# Patient Record
Sex: FEMALE | Race: WHITE | HISPANIC OR LATINO | Employment: FULL TIME | URBAN - METROPOLITAN AREA
[De-identification: names, ages, dates, MRNs, and addresses within clinical notes are randomized per-mention and may not be internally consistent; named-entity substitution may affect disease eponyms.]

---

## 2017-01-17 ENCOUNTER — TELEPHONE (OUTPATIENT)
Dept: OBGYN | Facility: MEDICAL CENTER | Age: 22
End: 2017-01-17

## 2017-01-17 DIAGNOSIS — N93.8 DUB (DYSFUNCTIONAL UTERINE BLEEDING): ICD-10-CM

## 2017-01-17 NOTE — TELEPHONE ENCOUNTER
Pt states that she has isreal for a year and a half and states that she has always had a regular a period however for the last couple months it has been irregular with spotting and coming later. Pt states that she is late 6 days and has taking pregnancy tests and they have all come back negative but pt is concerned.

## 2017-01-17 NOTE — TELEPHONE ENCOUNTER
Pt called back   Pts cycle are irregular & experience spotting  Pt has always gotten some spotting   Cycles have been irregular since she has gotten the IUD   Advised pt that it is normal for the IUD  UPT have come back negative  Pt is wanting a blood test to confirm that she is not pregnant  Pended HCG   Routed to Dr ALFRED

## 2017-01-17 NOTE — TELEPHONE ENCOUNTER
Left message for pt to call back   Pt needs to make an appt to discuss her cycles with Dr Montesinos

## 2017-09-26 ENCOUNTER — OFFICE VISIT (OUTPATIENT)
Dept: MEDICAL GROUP | Facility: MEDICAL CENTER | Age: 22
End: 2017-09-26
Payer: COMMERCIAL

## 2017-09-26 ENCOUNTER — HOSPITAL ENCOUNTER (OUTPATIENT)
Facility: MEDICAL CENTER | Age: 22
End: 2017-09-26
Attending: PHYSICIAN ASSISTANT
Payer: COMMERCIAL

## 2017-09-26 VITALS
HEART RATE: 72 BPM | OXYGEN SATURATION: 98 % | TEMPERATURE: 97.9 F | RESPIRATION RATE: 16 BRPM | HEIGHT: 64 IN | SYSTOLIC BLOOD PRESSURE: 108 MMHG | WEIGHT: 114.8 LBS | BODY MASS INDEX: 19.6 KG/M2 | DIASTOLIC BLOOD PRESSURE: 70 MMHG

## 2017-09-26 DIAGNOSIS — N89.8 VAGINAL DISCHARGE: ICD-10-CM

## 2017-09-26 PROCEDURE — 87660 TRICHOMONAS VAGIN DIR PROBE: CPT

## 2017-09-26 PROCEDURE — 99214 OFFICE O/P EST MOD 30 MIN: CPT | Performed by: PHYSICIAN ASSISTANT

## 2017-09-26 PROCEDURE — 87491 CHLMYD TRACH DNA AMP PROBE: CPT

## 2017-09-26 PROCEDURE — 87480 CANDIDA DNA DIR PROBE: CPT

## 2017-09-26 PROCEDURE — 87510 GARDNER VAG DNA DIR PROBE: CPT

## 2017-09-26 PROCEDURE — 87591 N.GONORRHOEAE DNA AMP PROB: CPT

## 2017-09-26 RX ORDER — FLUCONAZOLE 150 MG/1
150 TABLET ORAL ONCE
Qty: 2 TAB | Refills: 0 | Status: SHIPPED | OUTPATIENT
Start: 2017-09-26 | End: 2017-09-26

## 2017-09-26 ASSESSMENT — PATIENT HEALTH QUESTIONNAIRE - PHQ9: CLINICAL INTERPRETATION OF PHQ2 SCORE: 0

## 2017-09-26 NOTE — ASSESSMENT & PLAN NOTE
This is a 21-year-old female complains of a five-day history of possible urinary tract infection symptoms. She states that those symptoms improved and she is not having any complaints today. Was having some slight burning urination. Associated vaginal discharge that was brown. Associated itching. She recently was seen by a dentist and given Norco and amoxicillin. She took amoxicillin for 7 days. She is monogamous. Has had no previous STDs. She does have a chronic history of UTIs. These symptoms are very different. She has no urinary hesitancy or frequency.

## 2017-09-26 NOTE — PROGRESS NOTES
"Subjective:   Lupe Eaton is a 21 y.o. female here today for vaginal discharge with some vaginal itching and burning urination for 5 days.    Vaginal discharge  This is a 21-year-old female complains of a five-day history of possible urinary tract infection symptoms. She states that those symptoms improved and she is not having any complaints today. Was having some slight burning urination. Associated vaginal discharge that was brown. Associated itching. She recently was seen by a dentist and given Norco and amoxicillin. She took amoxicillin for 7 days. She is monogamous. Has had no previous STDs. She does have a chronic history of UTIs. These symptoms are very different. She has no urinary hesitancy or frequency.       Current medicines (including changes today)  Current Outpatient Prescriptions   Medication Sig Dispense Refill   • fluconazole (DIFLUCAN) 150 MG tablet Take 1 Tab by mouth Once for 1 dose. Then possibly another dose after 3 days. 2 Tab 0   • Levonorgestrel (ZAID) 13.5 MG IUD by Intrauterine route.       No current facility-administered medications for this visit.      She  has a past medical history of Allergy and Arrhythmia.    ROS   No chest pain, no shortness of breath, no abdominal pain and all other systems were reviewed and are negative.       Objective:     Blood pressure 108/70, pulse 72, temperature 36.6 °C (97.9 °F), resp. rate 16, height 1.626 m (5' 4\"), weight 52.1 kg (114 lb 12.8 oz), SpO2 98 %. Body mass index is 19.71 kg/m².   Physical Exam:  Constitutional: Alert, no distress.  Skin: Warm, dry, good turgor, no rashes in visible areas.  Eye: Equal, round and reactive, conjunctiva clear, lids normal.  ENMT: Lips without lesions, good dentition, oropharynx clear.  Neck: Trachea midline, no masses.   Lymph: No cervical or supraclavicular lymphadenopathy  Respiratory: Unlabored respiratory effort, lungs appear clear, no wheezes.  Cardiovascular: Regular rate and rhythm, no " edema.  Abdomen: Soft, non-tender, no masses. No CVA tenderness.  Psych: Alert and oriented x3, normal affect and mood.        Assessment and Plan:   The following treatment plan was discussed    1. Vaginal discharge  Acute, new onset condition. Unlikely UTI symptoms are not currently present. Concerned about recent antibiotic use. Could be a yeast infection but discharge is brown and not white. Treated with Diflucan 150 mg once and then 3 days after still symptomatic. Given her age we'll check gonorrhea and chlamydia. Also will have her self swab for vaginal DNA panel. Follow up with any worsening symptoms such as fevers or back pain.  - CHLAMYDIA/GC PCR URINE OR SWAB; Future  - VAGINAL PATHOGENS DNA PANEL; Future  - fluconazole (DIFLUCAN) 150 MG tablet; Take 1 Tab by mouth Once for 1 dose. Then possibly another dose after 3 days.  Dispense: 2 Tab; Refill: 0      Followup: Return if symptoms worsen or fail to improve.    Please note that this dictation was created using voice recognition software. I have made every reasonable attempt to correct obvious errors, but I expect that there are errors of grammar and possibly content that I did not discover before finalizing the note.

## 2017-09-27 LAB
C TRACH DNA SPEC QL NAA+PROBE: NEGATIVE
N GONORRHOEA DNA SPEC QL NAA+PROBE: NEGATIVE
SPECIMEN SOURCE: NORMAL

## 2017-12-22 ENCOUNTER — APPOINTMENT (OUTPATIENT)
Dept: MEDICAL GROUP | Facility: LAB | Age: 22
End: 2017-12-22
Payer: COMMERCIAL

## 2018-01-15 ENCOUNTER — APPOINTMENT (OUTPATIENT)
Dept: RADIOLOGY | Facility: IMAGING CENTER | Age: 23
End: 2018-01-15
Attending: NURSE PRACTITIONER
Payer: COMMERCIAL

## 2018-01-15 ENCOUNTER — OFFICE VISIT (OUTPATIENT)
Dept: URGENT CARE | Facility: CLINIC | Age: 23
End: 2018-01-15
Payer: COMMERCIAL

## 2018-01-15 VITALS
TEMPERATURE: 97.5 F | BODY MASS INDEX: 19.29 KG/M2 | WEIGHT: 113 LBS | HEART RATE: 78 BPM | SYSTOLIC BLOOD PRESSURE: 112 MMHG | DIASTOLIC BLOOD PRESSURE: 74 MMHG | OXYGEN SATURATION: 100 % | HEIGHT: 64 IN

## 2018-01-15 DIAGNOSIS — R07.89 CHEST WALL PAIN: ICD-10-CM

## 2018-01-15 DIAGNOSIS — R07.89 COSTOCHONDRAL CHEST PAIN: ICD-10-CM

## 2018-01-15 PROCEDURE — 99214 OFFICE O/P EST MOD 30 MIN: CPT | Performed by: NURSE PRACTITIONER

## 2018-01-15 PROCEDURE — 71046 X-RAY EXAM CHEST 2 VIEWS: CPT | Mod: TC | Performed by: NURSE PRACTITIONER

## 2018-01-15 ASSESSMENT — ENCOUNTER SYMPTOMS
DIAPHORESIS: 0
CLAUDICATION: 0
VOMITING: 0
FEVER: 0
HEADACHES: 0
IRREGULAR HEARTBEAT: 0
BACK PAIN: 0
SHORTNESS OF BREATH: 0
PALPITATIONS: 0

## 2018-01-16 NOTE — PATIENT INSTRUCTIONS
Costochondritis  Costochondritis, sometimes called Tietze syndrome, is a swelling and irritation (inflammation) of the tissue (cartilage) that connects your ribs with your breastbone (sternum). It causes pain in the chest and rib area. Costochondritis usually goes away on its own over time. It can take up to 6 weeks or longer to get better, especially if you are unable to limit your activities.  CAUSES   Some cases of costochondritis have no known cause. Possible causes include:  · Injury (trauma).  · Exercise or activity such as lifting.  · Severe coughing.  SIGNS AND SYMPTOMS  · Pain and tenderness in the chest and rib area.  · Pain that gets worse when coughing or taking deep breaths.  · Pain that gets worse with specific movements.  DIAGNOSIS   Your health care provider will do a physical exam and ask about your symptoms. Chest X-rays or other tests may be done to rule out other problems.  TREATMENT   Costochondritis usually goes away on its own over time. Your health care provider may prescribe medicine to help relieve pain.  HOME CARE INSTRUCTIONS   · Avoid exhausting physical activity. Try not to strain your ribs during normal activity. This would include any activities using chest, abdominal, and side muscles, especially if heavy weights are used.  · Apply ice to the affected area for the first 2 days after the pain begins.  ¨ Put ice in a plastic bag.  ¨ Place a towel between your skin and the bag.  ¨ Leave the ice on for 20 minutes, 2-3 times a day.  · Only take over-the-counter or prescription medicines as directed by your health care provider.  SEEK MEDICAL CARE IF:  · You have redness or swelling at the rib joints. These are signs of infection.  · Your pain does not go away despite rest or medicine.  SEEK IMMEDIATE MEDICAL CARE IF:   · Your pain increases or you are very uncomfortable.  · You have shortness of breath or difficulty breathing.  · You cough up blood.  · You have worse chest pains,  sweating, or vomiting.  · You have a fever or persistent symptoms for more than 2-3 days.  · You have a fever and your symptoms suddenly get worse.  MAKE SURE YOU:   · Understand these instructions.  · Will watch your condition.  · Will get help right away if you are not doing well or get worse.     This information is not intended to replace advice given to you by your health care provider. Make sure you discuss any questions you have with your health care provider.     Document Released: 09/27/2006 Document Revised: 10/08/2014 Document Reviewed: 07/22/2014  [employer] Interactive Patient Education ©2016 [employer]

## 2018-01-16 NOTE — PROGRESS NOTES
Subjective:      Lupe Eaton is a 22 y.o. female who presents with Chest Pain (Chest pain/patient had to lay down yesterday she states it hurt her so much/still persistant/no other symptoms)            Chest Pain    This is a new problem. Episode onset: Pt reports onset of chest pain 2 days ago. The pain became so intense yesterday she had to lie down to help it resolve. She reports the pain is constant and it hasn't changed much since onset. The onset quality is sudden. The problem occurs constantly. The problem has been unchanged. The pain is present in the lateral region. The pain is moderate. The quality of the pain is described as sharp. The pain does not radiate. Pertinent negatives include no back pain, claudication, diaphoresis, fever, headaches, irregular heartbeat, malaise/fatigue, palpitations, shortness of breath or vomiting. Associated symptoms comments: She denies any other symptoms associated with this pain. Denies any trauma to chest or recent cough/cold. She is concerned today because her father has hx of MI 3 separate times. She also reports that she participates in aerial exercise classes but her last class was 5 days before the onset of this pain and she doesn't remember hurting herself. Past medical history comments: no personal cardiac or significant medical history       Review of Systems   Constitutional: Negative for diaphoresis, fever and malaise/fatigue.   Respiratory: Negative for shortness of breath.    Cardiovascular: Positive for chest pain. Negative for palpitations and claudication.   Gastrointestinal: Negative for vomiting.   Musculoskeletal: Negative for back pain.   Neurological: Negative for headaches.   All other systems reviewed and are negative.    Past Medical History:   Diagnosis Date   • Allergy    • Arrhythmia     unknown as a child     History reviewed. No pertinent surgical history.   Social History     Social History   • Marital status: Single     Spouse name: N/A  "  • Number of children: N/A   • Years of education: N/A     Occupational History   • Not on file.     Social History Main Topics   • Smoking status: Never Smoker   • Smokeless tobacco: Never Used   • Alcohol use 1.0 oz/week     2 Glasses of wine per week   • Drug use: No   • Sexual activity: Yes     Partners: Male      Comment: LMP: on it now.      Other Topics Concern   • Not on file     Social History Narrative    born in [place],     works at [employer] and is working at [employer] her  degree          Objective:     /74   Pulse 78   Temp 36.4 °C (97.5 °F)   Ht 1.626 m (5' 4\")   Wt 51.3 kg (113 lb)   SpO2 100%   BMI 19.40 kg/m²      Physical Exam   Constitutional: She is oriented to person, place, and time. Vital signs are normal. She appears well-developed and well-nourished.   She appears to be anxious during exam   HENT:   Head: Normocephalic and atraumatic.   Nose: Nose normal.   Eyes: EOM are normal. Pupils are equal, round, and reactive to light.   Neck: Normal range of motion.   Cardiovascular: Normal rate, regular rhythm and normal heart sounds.    Pulmonary/Chest: Effort normal and breath sounds normal. She exhibits tenderness. She exhibits no mass, no bony tenderness and no deformity.       Musculoskeletal: Normal range of motion.   Neurological: She is alert and oriented to person, place, and time.   Skin: Skin is warm and dry. Capillary refill takes less than 2 seconds.   Psychiatric: She has a normal mood and affect. Her speech is normal and behavior is normal. Thought content normal.   Vitals reviewed.         EKG: NSR rate:68, normal axis, normal intervals, no evidence of ischemia or hypertrophy.    CXR: no acute cardiopulmonary process by my read, radiology pending.       1/15/2018 4:34 PM    HISTORY/REASON FOR EXAM:  Chest Pain.      TECHNIQUE/EXAM DESCRIPTION AND NUMBER OF VIEWS:  Two views of the chest.    COMPARISON:  None.    FINDINGS:  The heart is normal in " size.  No pulmonary infiltrates or consolidations are noted.  No pleural effusions are appreciated.  Hyperexpanded lungs.  Mild/moderate left convex scoliosis of the thoracic spine.   Impression       No pulmonary consolidation.       Assessment/Plan:     1. Chest wall pain  - DX-CHEST-2 VIEWS; Future  - EKG    2. Costochondral chest pain    Discussed with patient it is reassuring that her pain can be reproduced on exam. I am not concerned at this time that this is cardiac in origin. She V/U  I would like her to take 600 mg ibuprofen when she feels an increase in discomfort  The pain may last up to 2-3 weeks but should slowly begin to improve  She can continue to work out, let pain be her guide  Educational handout provided on costochondral pain  Supportive care, differential diagnoses, and indications for immediate follow-up discussed with patient.    Pathogenesis of diagnosis discussed including typical length and natural progression.      Instructed to return to  or nearest emergency department if symptoms fail to improve, for any change in condition, further concerns, or new concerning symptoms.  Patient states understanding of the plan of care and discharge instructions.

## 2018-03-28 ENCOUNTER — TELEPHONE (OUTPATIENT)
Dept: OBGYN | Facility: MEDICAL CENTER | Age: 23
End: 2018-03-28

## 2018-03-28 NOTE — TELEPHONE ENCOUNTER
----- Message from Rodo Portillo sent at 3/27/2018 11:10 AM PDT -----  Regarding: IUD ?s  Contact: [phone]  Pt needs to remove IUD and have it replaced. Has questions about whether she needs to wait until July or not.

## 2018-03-28 NOTE — TELEPHONE ENCOUNTER
Called the patient and left voicemail for pt to call back to discuss process of iud removal and replacement.

## 2018-04-03 NOTE — TELEPHONE ENCOUNTER
Patient called and I discussed the process and e-mailed her a form to sign and send back to get the process started

## 2018-04-04 ENCOUNTER — TELEPHONE (OUTPATIENT)
Dept: OBGYN | Facility: MEDICAL CENTER | Age: 23
End: 2018-04-04

## 2018-04-04 DIAGNOSIS — Z30.433 ENCOUNTER FOR IUD REMOVAL AND REINSERTION: ICD-10-CM

## 2018-04-05 ENCOUNTER — GYNECOLOGY VISIT (OUTPATIENT)
Dept: OBGYN | Facility: MEDICAL CENTER | Age: 23
End: 2018-04-05
Payer: COMMERCIAL

## 2018-04-05 ENCOUNTER — HOSPITAL ENCOUNTER (OUTPATIENT)
Facility: MEDICAL CENTER | Age: 23
End: 2018-04-05
Attending: OBSTETRICS & GYNECOLOGY
Payer: COMMERCIAL

## 2018-04-05 VITALS
SYSTOLIC BLOOD PRESSURE: 105 MMHG | DIASTOLIC BLOOD PRESSURE: 60 MMHG | BODY MASS INDEX: 19.63 KG/M2 | HEIGHT: 64 IN | WEIGHT: 115 LBS

## 2018-04-05 DIAGNOSIS — Z01.419 WELL WOMAN EXAM WITH ROUTINE GYNECOLOGICAL EXAM: ICD-10-CM

## 2018-04-05 DIAGNOSIS — Z11.3 SCREEN FOR SEXUALLY TRANSMITTED DISEASES: ICD-10-CM

## 2018-04-05 DIAGNOSIS — N39.0 URINARY TRACT INFECTION WITHOUT HEMATURIA, SITE UNSPECIFIED: ICD-10-CM

## 2018-04-05 DIAGNOSIS — N30.00 ACUTE CYSTITIS WITHOUT HEMATURIA: ICD-10-CM

## 2018-04-05 DIAGNOSIS — Z12.4 PAP SMEAR FOR CERVICAL CANCER SCREENING: ICD-10-CM

## 2018-04-05 LAB
APPEARANCE UR: CLEAR
BILIRUB UR STRIP-MCNC: NEGATIVE MG/DL
COLOR UR AUTO: YELLOW
GLUCOSE UR STRIP.AUTO-MCNC: NEGATIVE MG/DL
KETONES UR STRIP.AUTO-MCNC: NEGATIVE MG/DL
LEUKOCYTE ESTERASE UR QL STRIP.AUTO: NEGATIVE
NITRITE UR QL STRIP.AUTO: POSITIVE
PH UR STRIP.AUTO: 5.5 [PH] (ref 5–8)
PROT UR QL STRIP: NEGATIVE MG/DL
RBC UR QL AUTO: NEGATIVE
SP GR UR STRIP.AUTO: 1.02
UROBILINOGEN UR STRIP-MCNC: NORMAL MG/DL

## 2018-04-05 PROCEDURE — 87591 N.GONORRHOEAE DNA AMP PROB: CPT

## 2018-04-05 PROCEDURE — 87491 CHLMYD TRACH DNA AMP PROBE: CPT

## 2018-04-05 PROCEDURE — 88175 CYTOPATH C/V AUTO FLUID REDO: CPT

## 2018-04-05 PROCEDURE — 99395 PREV VISIT EST AGE 18-39: CPT | Performed by: OBSTETRICS & GYNECOLOGY

## 2018-04-05 PROCEDURE — 81002 URINALYSIS NONAUTO W/O SCOPE: CPT | Performed by: OBSTETRICS & GYNECOLOGY

## 2018-04-05 RX ORDER — NITROFURANTOIN 25; 75 MG/1; MG/1
100 CAPSULE ORAL 2 TIMES DAILY
Qty: 14 CAP | Refills: 0 | Status: SHIPPED | OUTPATIENT
Start: 2018-04-05 | End: 2018-09-11

## 2018-04-05 NOTE — PROGRESS NOTES
"Subjective:      Lupe Eaton is a 22 y.o. female who presents with Annual Exam (annual exam-iud issues)        CC: annual exam    HPI: 21 yo G0 using Yana for contraception (placed 7/15) presents for annual exam. Complains of foul smell to urine and some pain with intercourse. No hx of abnormal pap smears.    ROS REVIEW OF SYSTEMS:    Pertinent positives and negatives mentioned in HPI.    All other systems reviewed and are negative or noncontributory.       Objective:     /60   Ht 1.626 m (5' 4\")   Wt 52.2 kg (115 lb)   Breastfeeding? No   BMI 19.74 kg/m²      Physical Exam      GENERAL: Alert, in no apparent distress  PSYCHIATRIC: Appropriate affect, intact insight and judgement.  NECK:  Nontender, no masses.  No Thyromegaly or nodules. No lymphadenopathy.  RESPIRATORY: Normal respiratory effort.  Lungs clear to auscultation.   CARDIOVASCULAR: RRR, no murmur, gallop, or rub.  ABDOMEN: Soft, nontender, nondistended.  No palpable masses.  No rebound or guarding.  No inguinal lymphadenopathy.  No hepatosplenomegaly.  No hernias.  BACK: No CVA tenderness  EXTREMITIES: No edema  SKIN: No rash    BREAST: No masses or tenderness.  No skin changes.  No nipple inversion or discharge. No axillary lymphadenopathy.      GENITOURINARY:  Normal external genitalia, no lesions.  Normal urethral meatus, no masses or tenderness.  Normal bladder without fullness or masses.  Vagina well estrogenized, no vaginal discharge or lesions.  Cervix without lesions or discharge, nontender. IUD strings present at cervical os.   Uterus normal size, shape, and contour, nontender.  Adnexa nontender, no masses.  Normal anus and perineum.    Rectal Exam - not indicated.     Urine dip - + nitrates  Assessment/Plan:     1. Well woman exam with routine gynecological exam  Reviewed monthly self breast exams and need for yearly mammograms starting at age 40.  Discussed calcium intake, Vitamin D,  and weight bearing exercise for bone " health.   - THINPREP RFLX HPV ASCUS W/CTNG; Future    2. Screen for sexually transmitted diseases  - THINPREP RFLX HPV ASCUS W/CTNG; Future    3. Pap smear for cervical cancer screening  - THINPREP RFLX HPV ASCUS W/CTNG; Future    4. Urinary tract infection without hematuria, site unspecified  Macrobid 100 mg po bid for 7 days.   - POCT Urinalysis    5. Acute cystitis without hematuria  - POCT Urinalysis  - URINE CULTURE(NEW); Future    F/U for Yana removal and replacement (pt states she already had referral placed).

## 2018-04-07 LAB
C TRACH DNA GENITAL QL NAA+PROBE: NEGATIVE
CYTOLOGY REG CYTOL: NORMAL
N GONORRHOEA DNA GENITAL QL NAA+PROBE: NEGATIVE
SPECIMEN SOURCE: NORMAL

## 2018-08-06 ENCOUNTER — GYNECOLOGY VISIT (OUTPATIENT)
Dept: OBGYN | Facility: MEDICAL CENTER | Age: 23
End: 2018-08-06
Payer: COMMERCIAL

## 2018-08-06 VITALS
WEIGHT: 116 LBS | DIASTOLIC BLOOD PRESSURE: 62 MMHG | HEIGHT: 64 IN | BODY MASS INDEX: 19.81 KG/M2 | SYSTOLIC BLOOD PRESSURE: 110 MMHG

## 2018-08-06 DIAGNOSIS — Z30.430 ENCOUNTER FOR IUD INSERTION: ICD-10-CM

## 2018-08-06 LAB
INT CON NEG: NEGATIVE
INT CON POS: POSITIVE
POC URINE PREGNANCY TEST: NEGATIVE

## 2018-08-06 PROCEDURE — 81025 URINE PREGNANCY TEST: CPT | Performed by: OBSTETRICS & GYNECOLOGY

## 2018-08-06 PROCEDURE — 58301 REMOVE INTRAUTERINE DEVICE: CPT | Performed by: OBSTETRICS & GYNECOLOGY

## 2018-08-06 PROCEDURE — 58300 INSERT INTRAUTERINE DEVICE: CPT | Performed by: OBSTETRICS & GYNECOLOGY

## 2018-08-06 NOTE — PROGRESS NOTES
PROCEDURE NOTE: REMOVAL OF  IUD, REPLACEMENT OF IUD.      Today the patient is counseled on the risks of IUD insertion. Specifically discussed were alternative forms of birth control. I also discussed with the patient the risk of infection on insertion, and had asked the patient to remain on pelvic rest for one week following the insertion. We also discussed the risk of IUD expulsion, the risk of uterine perforation and IUD migration. If the IUD does migrate the patient may require further testing and a separate procedure such as a laparoscopy to retrieve the migrated IUD. I also discussed the 1% risk of pregnancy with IUD use. I also discussed the side effects of possible amenorrhea or irregular bleeding with the Mirena IUD, or heavy, painful menses with the copper IUD.The patient was given the opportunity to ask questions regarding insertion, risks and benefits, all questions are answered in their entirety.  Informed consent is signed.    Procedure note  Urine pregnancy test is negative, informed consent was previously signed.  The bimanual exam is performed the uterus is noted to be 7 weeks in size and is mid position.  A speculum was inserted into the vagina.  The IUD strings were grasped with the Arcadia clamp and the intact Yana IUD was removed and discarded.  The cervix was cleansed with Betadine swabs x3.  A tenaculum was placed on the anterior lip of the cervix  The uterus was sounded to 7 centimeters  The Yana IUD was placed under sterile conditions.  The strings were trimmed to approximately 3 cm.  The tenaculum was removed from the cervix and hemostasis was achieved with silver nitrate.  The patient tolerated the procedure well.    The patient is asked to followup in 4 weeks for IUD check. The patient is asked to remain on pelvic rest for one week. She is asked to return sooner than 4 weeks for heavy vaginal bleeding uncontrolled pain or fever.

## 2018-09-05 ENCOUNTER — GYNECOLOGY VISIT (OUTPATIENT)
Dept: OBGYN | Facility: MEDICAL CENTER | Age: 23
End: 2018-09-05
Payer: COMMERCIAL

## 2018-09-05 VITALS
SYSTOLIC BLOOD PRESSURE: 119 MMHG | HEIGHT: 64 IN | WEIGHT: 117 LBS | BODY MASS INDEX: 19.97 KG/M2 | DIASTOLIC BLOOD PRESSURE: 61 MMHG

## 2018-09-05 DIAGNOSIS — Z30.431 ENCOUNTER FOR ROUTINE CHECKING OF INTRAUTERINE CONTRACEPTIVE DEVICE (IUD): ICD-10-CM

## 2018-09-05 DIAGNOSIS — Z11.3 SCREEN FOR STD (SEXUALLY TRANSMITTED DISEASE): ICD-10-CM

## 2018-09-05 PROCEDURE — 99213 OFFICE O/P EST LOW 20 MIN: CPT | Performed by: OBSTETRICS & GYNECOLOGY

## 2018-09-05 NOTE — PROGRESS NOTES
"S: This is a 22 y.o. year old  who is s/p Yana IUD placement on 18.  She has had some spotting and cramping since the insertion, but currently has no complaints. Denies pelvic pain, heavy vaginal bleeding, or abnormal vaginal discharge.     O: Blood pressure 119/61, height 1.626 m (5' 4\"), weight 53.1 kg (117 lb), not currently breastfeeding.    GENERAL: Alert, in no apparent distress  PSYCHIATRIC: Appropriate affect, intact insight and judgement.  ABDOMEN: Soft, nontender, nondistended.  No palpable masses.  No rebound or guarding.  No inguinal lymphadenopathy.  No hepatosplenomegaly.  No hernias.    GENITOURINARY:  Normal external genitalia, no lesions.  Normal urethral meatus, no masses or tenderness.  Normal bladder without fullness or masses.  Vagina well estrogenized, no vaginal discharge or lesions.  Cervix without lesions or discharge, nontender. IUD string present at cervical os.   Uterus normal size, shape, and contour, nontender.  Adnexa nontender, no masses.  Normal anus and perineum.    Rectal Exam - not indicated.    ASSESSMENT:1.  s/p Yana  IUD Placement, in proper position.  No side effects.  2. Patient requests blood testing for STDs.  Recent GC and pap normal.     PLAN: F/U PRN  "

## 2018-09-11 ENCOUNTER — OFFICE VISIT (OUTPATIENT)
Dept: MEDICAL GROUP | Facility: MEDICAL CENTER | Age: 23
End: 2018-09-11
Payer: COMMERCIAL

## 2018-09-11 VITALS
SYSTOLIC BLOOD PRESSURE: 102 MMHG | HEART RATE: 59 BPM | BODY MASS INDEX: 19.57 KG/M2 | HEIGHT: 64 IN | OXYGEN SATURATION: 99 % | DIASTOLIC BLOOD PRESSURE: 58 MMHG | TEMPERATURE: 99.3 F | WEIGHT: 114.64 LBS

## 2018-09-11 DIAGNOSIS — F41.8 SITUATIONAL ANXIETY: ICD-10-CM

## 2018-09-11 DIAGNOSIS — Z76.89 ENCOUNTER TO ESTABLISH CARE: ICD-10-CM

## 2018-09-11 PROBLEM — N89.8 VAGINAL DISCHARGE: Status: RESOLVED | Noted: 2017-09-26 | Resolved: 2018-09-11

## 2018-09-11 PROCEDURE — 99214 OFFICE O/P EST MOD 30 MIN: CPT | Performed by: PHYSICIAN ASSISTANT

## 2018-09-11 RX ORDER — LORAZEPAM 1 MG/1
1 TABLET ORAL EVERY 4 HOURS PRN
Qty: 30 TAB | Refills: 0 | Status: SHIPPED | OUTPATIENT
Start: 2018-09-11 | End: 2019-07-25 | Stop reason: SDUPTHER

## 2018-09-11 ASSESSMENT — PATIENT HEALTH QUESTIONNAIRE - PHQ9: CLINICAL INTERPRETATION OF PHQ2 SCORE: 0

## 2018-09-11 NOTE — PROGRESS NOTES
"Subjective:   Lupe Eaton is a 22 y.o. female here today for anxiety while flying in to establish care.    Situational anxiety  This is a 22-year-old female who is here today to establish care. Chronic history of anxiety while flying. Flies one to 2 times monthly. Flies for her job. Is going to [employer] soon. Leaves for her wrists next month. She had a emergency landing situation. She thought she was given a diet. Has flashbacks when flying. Last time she flew to [place] she had to hold the hand of a unsuspecting passenger. She states that she may drink tequila prior. Otherwise she feels like she may pass out. She has difficulty breathing. Panic attack.       Current medicines (including changes today)  Current Outpatient Prescriptions   Medication Sig Dispense Refill   • LORazepam (ATIVAN) 1 MG Tab Take 1 Tab by mouth every four hours as needed for Anxiety for up to 30 days. 30 Tab 0   • Levonorgestrel (ZAID) 13.5 MG IUD by Intrauterine route.       No current facility-administered medications for this visit.      She  has a past medical history of Allergy and Arrhythmia.    Social History and Family History were reviewed and updated.    ROS   No chest pain, no shortness of breath, no abdominal pain and all other systems were reviewed and are negative.       Objective:     Blood pressure 102/58, pulse (!) 59, temperature 37.4 °C (99.3 °F), height 1.626 m (5' 4\"), weight 52 kg (114 lb 10.2 oz), SpO2 99 %. Body mass index is 19.68 kg/m².   Physical Exam:  Constitutional: Alert, no distress.  Skin: Warm, dry, good turgor, no rashes in visible areas.  Eye: Equal, round and reactive, conjunctiva clear, lids normal.  ENMT: Lips without lesions, good dentition, oropharynx clear.  Neck: Trachea midline, no masses.   Lymph: No cervical or supraclavicular lymphadenopathy  Respiratory: Unlabored respiratory effort, lungs clear to auscultation, no wheezes, no ronchi.  Cardiovascular: Normal S1, S2, no murmur, no " edema.  Abdomen: Soft, non-tender, no masses.  Psych: Alert and oriented x3, normal affect and mood.        Assessment and Plan:   The following treatment plan was discussed    1. Situational anxiety  Chronic condition. New condition on chart. We'll provide Ativan 1 mg. May cut tablet in half initially. Advised to try a trial prior to flying. Will only last approximately 4 hours but may may last longer depending on the situation. Referred to psychology. Also advised to seek possible hypnotherapy. Contact me through my chart with any concerns.  - LORazepam (ATIVAN) 1 MG Tab; Take 1 Tab by mouth every four hours as needed for Anxiety for up to 30 days.  Dispense: 30 Tab; Refill: 0  - REFERRAL TO BEHAVIORAL HEALTH    2. Encounter to establish care      Followup: Return if symptoms worsen or fail to improve.    Please note that this dictation was created using voice recognition software. I have made every reasonable attempt to correct obvious errors, but I expect that there are errors of grammar and possibly content that I did not discover before finalizing the note.

## 2018-09-11 NOTE — ASSESSMENT & PLAN NOTE
This is a 22-year-old female who is here today to establish care. Chronic history of anxiety while flying. Flies one to 2 times monthly. Flies for her job. Is going to [employer] soon. Leaves for her wrists next month. She had a emergency landing situation. She thought she was given a diet. Has flashbacks when flying. Last time she flew to [employer] she had to hold the hand of a unsuspecting passenger. She states that she may drink tequila prior. Otherwise she feels like she may pass out. She has difficulty breathing. Panic attack.

## 2018-12-26 ENCOUNTER — TELEPHONE (OUTPATIENT)
Dept: OBGYN | Facility: CLINIC | Age: 23
End: 2018-12-26

## 2018-12-26 NOTE — TELEPHONE ENCOUNTER
Pt c/o got her Yana on 8/18 had a normal period on august, september, October but hasn't on November and December, already took 3 pregnancy test all they were negative. Wants to see fi this is normal or what she can do.    Consulted w/Dr. Smith. MD. Advised to get a pregnancy test and set up an appt to see her.  Pt was notified and transfer ti Iman to schedule.

## 2018-12-28 ENCOUNTER — GYNECOLOGY VISIT (OUTPATIENT)
Dept: OBGYN | Facility: MEDICAL CENTER | Age: 23
End: 2018-12-28
Payer: COMMERCIAL

## 2018-12-28 VITALS
SYSTOLIC BLOOD PRESSURE: 100 MMHG | BODY MASS INDEX: 19.95 KG/M2 | WEIGHT: 116.84 LBS | DIASTOLIC BLOOD PRESSURE: 68 MMHG | HEIGHT: 64 IN

## 2018-12-28 DIAGNOSIS — N91.2 AMENORRHEA: ICD-10-CM

## 2018-12-28 DIAGNOSIS — N30.00 ACUTE CYSTITIS WITHOUT HEMATURIA: ICD-10-CM

## 2018-12-28 LAB
APPEARANCE UR: NORMAL
BILIRUB UR STRIP-MCNC: NORMAL MG/DL
COLOR UR AUTO: NORMAL
GLUCOSE UR STRIP.AUTO-MCNC: NEGATIVE MG/DL
INT CON NEG: NEGATIVE
INT CON POS: POSITIVE
KETONES UR STRIP.AUTO-MCNC: NORMAL MG/DL
LEUKOCYTE ESTERASE UR QL STRIP.AUTO: NEGATIVE
NITRITE UR QL STRIP.AUTO: POSITIVE
PH UR STRIP.AUTO: 5.5 [PH] (ref 5–8)
POC URINE PREGNANCY TEST: NEGATIVE
PROT UR QL STRIP: NEGATIVE MG/DL
RBC UR QL AUTO: NEGATIVE
SP GR UR STRIP.AUTO: 1.02
UROBILINOGEN UR STRIP-MCNC: NORMAL MG/DL

## 2018-12-28 PROCEDURE — 81025 URINE PREGNANCY TEST: CPT | Performed by: OBSTETRICS & GYNECOLOGY

## 2018-12-28 PROCEDURE — 99214 OFFICE O/P EST MOD 30 MIN: CPT | Performed by: OBSTETRICS & GYNECOLOGY

## 2018-12-28 PROCEDURE — 81002 URINALYSIS NONAUTO W/O SCOPE: CPT | Performed by: OBSTETRICS & GYNECOLOGY

## 2018-12-28 RX ORDER — NITROFURANTOIN 25; 75 MG/1; MG/1
100 CAPSULE ORAL 2 TIMES DAILY
Qty: 14 CAP | Refills: 0 | Status: SHIPPED | OUTPATIENT
Start: 2018-12-28 | End: 2019-07-25

## 2018-12-28 NOTE — PROGRESS NOTES
Pt presents for GYN consult to evaluate amenorrhea with Sklya IUD. Pt had previous isreal and was ahving very light periods now none since insert 9/2018.Pt denies pain, cramping,nausea/vomiting or other c/o.She has done multiple home Ept's and all have been negative. Repeat today also negative.

## 2018-12-28 NOTE — PROGRESS NOTES
"S: Lupe presents with complaint of no period for 2 months.  She had a Yana IUD placed in September.  She had a period after the placement, but reports no period.  She is anxious, as she had periods with her previous Yana.  No spotting, vaginal discharge, or pain.  Also with strong odor to urine.  No urinary urgency, frequency, or dysuria.      O:Blood pressure 100/68, height 1.626 m (5' 4\"), weight 53 kg (116 lb 13.5 oz), not currently breastfeeding.    GENERAL: Alert, in no apparent distress  PSYCHIATRIC: Appropriate affect, intact insight and judgement.  ABDOMEN: Soft, nontender, nondistended.  No palpable masses.  No rebound or guarding.  No inguinal lymphadenopathy.  No hepatosplenomegaly.  No hernias.  BACK: No CVA tenderness  EXTREMITIES: No edema  SKIN: No rash      GENITOURINARY:  Normal external genitalia, no lesions.  Normal urethral meatus, no masses or tenderness.  Normal bladder without fullness or masses.  Vagina well estrogenized, no vaginal discharge or lesions.  Cervix without lesions or discharge, nontender.  IUD strings present at cervical os.   Uterus normal size, shape, and contour, nontender.  Adnexa nontender, no masses.  Normal anus and perineum.    Rectal Exam - not indicated.    Urine pregnancy test - negative  Urine dip - + nitrates    A/P:  1. Amenorrhea - explained this is a normal side effect of Yana.  Urine pregnancy test negative. IUD in proper position.   2. UTI - Macrobid 100 mg po bid for 7 days.    F/U prn.  "

## 2019-06-07 ENCOUNTER — APPOINTMENT (OUTPATIENT)
Dept: RADIOLOGY | Facility: IMAGING CENTER | Age: 24
End: 2019-06-07
Attending: PHYSICIAN ASSISTANT
Payer: COMMERCIAL

## 2019-06-07 ENCOUNTER — OFFICE VISIT (OUTPATIENT)
Dept: URGENT CARE | Facility: CLINIC | Age: 24
End: 2019-06-07
Payer: COMMERCIAL

## 2019-06-07 VITALS
HEIGHT: 64 IN | RESPIRATION RATE: 16 BRPM | BODY MASS INDEX: 19.97 KG/M2 | WEIGHT: 117 LBS | OXYGEN SATURATION: 98 % | DIASTOLIC BLOOD PRESSURE: 78 MMHG | SYSTOLIC BLOOD PRESSURE: 110 MMHG | TEMPERATURE: 98 F | HEART RATE: 68 BPM

## 2019-06-07 DIAGNOSIS — M75.41 IMPINGEMENT SYNDROME OF RIGHT SHOULDER: Primary | ICD-10-CM

## 2019-06-07 DIAGNOSIS — M75.41 IMPINGEMENT SYNDROME OF RIGHT SHOULDER: ICD-10-CM

## 2019-06-07 PROCEDURE — 73030 X-RAY EXAM OF SHOULDER: CPT | Mod: TC,RT | Performed by: PHYSICIAN ASSISTANT

## 2019-06-07 PROCEDURE — 99214 OFFICE O/P EST MOD 30 MIN: CPT | Performed by: PHYSICIAN ASSISTANT

## 2019-06-07 RX ORDER — METHYLPREDNISOLONE 4 MG/1
TABLET ORAL
Qty: 21 TAB | Refills: 0 | Status: SHIPPED | OUTPATIENT
Start: 2019-06-07 | End: 2019-07-25

## 2019-06-08 NOTE — PATIENT INSTRUCTIONS
Shoulder Impingement Syndrome  Shoulder impingement syndrome is a condition that causes pain when connective tissues (tendons) surrounding the shoulder joint become pinched. These tendons are part of the group of muscles and tissues that help to stabilize the shoulder (rotator cuff). Beneath the rotator cuff is a fluid-filled sac (bursa) that allows the muscles and tendons to glide smoothly. The bursa may become swollen or irritated (bursitis). Bursitis, swelling in the rotator cuff tendons, or both conditions can decrease how much space is under a bone in the shoulder joint (acromion), resulting in impingement.  What are the causes?  Shoulder impingement syndrome can be caused by bursitis or swelling of the rotator cuff tendons, which may result from:  · Repetitive overhead arm movements.  · Falling onto the shoulder.  · Weakness in the shoulder muscles.  What increases the risk?  You may be more likely to develop this condition if you are an athlete who participates in:  · Sports that involve throwing, such as baseball.  · Tennis.  · Swimming.  · Volleyball.  Some people are also more likely to develop impingement syndrome because of the shape of their acromion bone.  What are the signs or symptoms?  The main symptom of this condition is pain on the front or side of the shoulder. Pain may:  · Get worse when lifting or raising the arm.  · Get worse at night.  · Wake you up from sleeping.  · Feel sharp when the shoulder is moved, and then fade to an ache.  Other signs and symptoms may include:  · Tenderness.  · Stiffness.  · Inability to raise the arm above shoulder level or behind the body.  · Weakness.  How is this diagnosed?  This condition may be diagnosed based on:  · Your symptoms.  · Your medical history.  · A physical exam.  · Imaging tests, such as:  ¨ X-rays.  ¨ MRI.  ¨ Ultrasound.  How is this treated?  Treatment for this condition may include:  · Resting your shoulder and avoiding all activities that  cause pain or put stress on the shoulder.  · Icing your shoulder.  · NSAIDs to help reduce pain and swelling.  · One or more injections of medicines to numb the area and reduce inflammation.  · Physical therapy.  · Surgery. This may be needed if nonsurgical treatments have not helped. Surgery may involve repairing the rotator cuff, reshaping the acromion, or removing the bursa.  Follow these instructions at home:  Managing pain, stiffness, and swelling  · If directed, apply ice to the injured area.  ¨ Put ice in a plastic bag.  ¨ Place a towel between your skin and the bag.  ¨ Leave the ice on for 20 minutes, 2-3 times a day.  Activity  · Rest and return to your normal activities as told by your health care provider. Ask your health care provider what activities are safe for you.  · Do exercises as told by your health care provider.  General instructions  · Do not use any tobacco products, including cigarettes, chewing tobacco, or e-cigarettes. Tobacco can delay healing. If you need help quitting, ask your health care provider.  · Ask your health care provider when it is safe for you to drive.  · Take over-the-counter and prescription medicines only as told by your health care provider.  · Keep all follow-up visits as told by your health care provider. This is important.  How is this prevented?  · Give your body time to rest between periods of activity.  · Be safe and responsible while being active to avoid falls.  · Maintain physical fitness, including strength and flexibility.  Contact a health care provider if:  · Your symptoms have not improved after 1-2 months of treatment and rest.  · You cannot lift your arm away from your body.  This information is not intended to replace advice given to you by your health care provider. Make sure you discuss any questions you have with your health care provider.  Document Released: 12/18/2006 Document Revised: 08/24/2017 Document Reviewed: 11/19/2016  [employer]  Patient Education © 2017 [employer]

## 2019-06-10 NOTE — PROGRESS NOTES
Subjective:      Pt is a 23 y.o. female who presents with Shoulder Pain (hurts laying down,and when breathing,x5 days )            HPI  This is a new problem. Pt notes right shoulder pain without known trauma or injury x 5 days. Worse when laying down or deep breathing. Pt has not taken any Rx medications for this condition. Pt states the pain is a 7/10, aching in nature and worse at night. Pt denies CP, SOB, NVD, paresthesias, headaches, dizziness, change in vision, hives, or other joint pain. The pt's medication list, problem list, and allergies have been evaluated and reviewed during today's visit.    PMH:  Past Medical History:   Diagnosis Date   • Allergy    • Arrhythmia     unknown as a child        PSH:  Negative per pt.      Fam Hx:    family history includes Heart Disease in her father; Hypertension in her mother.  Family Status   Relation Status   • Mo Alive        Migraine h/a   • Fa    • Sis Alive   • Bro Alive   • Sis Alive   • Bro Alive   • Bro Alive       Soc HX:  Social History     Social History   • Marital status: Single     Spouse name: N/A   • Number of children: N/A   • Years of education: N/A     Occupational History   • Not on file.     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   HENT: Negative for congestion and sore throat.    Eyes: Negative for blurred vision, double vision and photophobia.   Respiratory: Negative for cough and shortness of breath.  Cardiovascular: Negative for chest pain and palpitations.   Gastrointestinal: Negative for heartburn, nausea, vomiting, abdominal pain, diarrhea and constipation.   Genitourinary: Negative for dysuria and flank pain.   Musculoskeletal: POS for right shoulder joint pain and myalgias.   Skin: Negative for itching and rash.   Neurological: Negative for dizziness, tingling and headaches.   Endo/Heme/Allergies: Does not bruise/bleed easily.   Psychiatric/Behavioral: Negative for depression. The patient is not nervous/anxious.           Objective:     /78 (BP Location: Left arm, Patient Position: Sitting)   Pulse 68   Temp 36.7 °C (98 °F) (Temporal)   Resp 16   Ht 1.626 m (5' 4\")   Wt 53.1 kg (117 lb)   SpO2 98%   BMI 20.08 kg/m²      Physical Exam   Musculoskeletal:        Right shoulder: She exhibits decreased range of motion, tenderness, bony tenderness, pain, spasm and decreased strength. She exhibits no swelling, no effusion, no crepitus, no deformity, no laceration and normal pulse.        Arms:         Constitutional: PT is oriented to person, place, and time. PT appears well-developed and well-nourished. No distress.   HENT:   Head: Normocephalic and atraumatic.   Mouth/Throat: Oropharynx is clear and moist. No oropharyngeal exudate.   Eyes: Conjunctivae normal and EOM are normal. Pupils are equal, round, and reactive to light.   Neck: Normal range of motion. Neck supple. No thyromegaly present.   Cardiovascular: Normal rate, regular rhythm, normal heart sounds and intact distal pulses.  Exam reveals no gallop and no friction rub.    No murmur heard.  Pulmonary/Chest: Effort normal and breath sounds " normal. No respiratory distress. PT has no wheezes. PT has no rales. Pt exhibits no tenderness.   Abdominal: Soft. Bowel sounds are normal. PT exhibits no distension and no mass. There is no tenderness. There is no rebound and no guarding.   Neurological: PT is alert and oriented to person, place, and time. PT has normal reflexes. No cranial nerve deficit.   Skin: Skin is warm and dry. No rash noted. PT is not diaphoretic. No erythema.       Psychiatric: PT has a normal mood and affect. PT behavior is normal. Judgment and thought content normal.     RADS:  Narrative       6/7/2019 6:10 PM    HISTORY/REASON FOR EXAM:  Atraumatic Pain/Swelling/Deformity  Right shoulder pain for 5 days    TECHNIQUE/EXAM DESCRIPTION AND NUMBER OF VIEWS:  4 views of the RIGHT shoulder.    COMPARISON: None    FINDINGS:  No acute fracture or dislocation. Soft tissues are unremarkable.   Impression       No acute fracture or significant arthropathy.   Reading Provider Reading Date   Megan Sawyer M.D. Jun 7, 2019   Signing Provider Signing Date Signing Time   Megan Sawyer M.D. Jun 7, 2019  6:34 PM          Assessment/Plan:     1. Impingement syndrome of right shoulder    - DX-SHOULDER 2+ RIGHT; Future  - MethylPREDNISolone (MEDROL DOSEPAK) 4 MG Tablet Therapy Pack; Use as directed  Dispense: 21 Tab; Refill: 0    RICE therapy discussed  Gentle ROM exercises discussed  WBAT right shoulder  Ice/heat therapy discussed  OTC ibuprofen for pain control  Rest, fluids encouraged.  AVS with medical info given.  Pt was in full understanding and agreement with the plan.  Follow-up as needed if symptoms worsen or fail to improve.

## 2019-07-25 ENCOUNTER — OFFICE VISIT (OUTPATIENT)
Dept: MEDICAL GROUP | Facility: MEDICAL CENTER | Age: 24
End: 2019-07-25
Payer: COMMERCIAL

## 2019-07-25 VITALS
HEART RATE: 64 BPM | BODY MASS INDEX: 20.66 KG/M2 | HEIGHT: 64 IN | RESPIRATION RATE: 16 BRPM | DIASTOLIC BLOOD PRESSURE: 64 MMHG | SYSTOLIC BLOOD PRESSURE: 102 MMHG | WEIGHT: 121 LBS | TEMPERATURE: 97.3 F | OXYGEN SATURATION: 98 %

## 2019-07-25 DIAGNOSIS — L74.0 HEAT RASH: ICD-10-CM

## 2019-07-25 DIAGNOSIS — F41.8 SITUATIONAL ANXIETY: ICD-10-CM

## 2019-07-25 PROCEDURE — 99214 OFFICE O/P EST MOD 30 MIN: CPT | Performed by: PHYSICIAN ASSISTANT

## 2019-07-25 RX ORDER — LORAZEPAM 1 MG/1
1 TABLET ORAL EVERY 4 HOURS PRN
Qty: 30 TAB | Refills: 0 | Status: SHIPPED | OUTPATIENT
Start: 2019-07-25 | End: 2019-08-24

## 2019-07-25 RX ORDER — TRIAMCINOLONE ACETONIDE 5 MG/G
CREAM TOPICAL
Qty: 60 G | Refills: 3 | Status: SHIPPED | OUTPATIENT
Start: 2019-07-25

## 2019-07-25 ASSESSMENT — PATIENT HEALTH QUESTIONNAIRE - PHQ9: CLINICAL INTERPRETATION OF PHQ2 SCORE: 0

## 2019-07-25 NOTE — PROGRESS NOTES
"Subjective:   Lupe Eaton is a 23 y.o. female here today for heat rash and situational anxiety.    Heat rash  This is a 23-year-old female who is leaving for [place] in August because of her fiancé's work.  She has noticed this year that she breaks out in a rash when she is out in the heat.  She believes when her skin is exposed to the son that she will get itching and then she will bruises help because she is itching so hard.  Last week it was on her legs.  She went hiking.  The other day it was on her arms.  She is using no creams.  The other week she broke out in hives.  She was interested in seeing a specialist.    Situational anxiety  Also still has situational anxiety relating to his traveling.  Has a history of an emergency landing that cause significant anxiety.  She takes Ativan as needed.  Last prescription was provided and filled in October last year.  She is not certain where she stands with her medication supply.  Is requesting a possible renewal because she is leaving town.       Current medicines (including changes today)  Current Outpatient Prescriptions   Medication Sig Dispense Refill   • triamcinolone acetonide (KENALOG) 0.5 % Cream Apply sparingly three times daily PRN. 60 g 3   • LORazepam (ATIVAN) 1 MG Tab Take 1 Tab by mouth every four hours as needed for Anxiety for up to 30 days. 30 Tab 0   • Levonorgestrel (ZAID) 13.5 MG IUD by Intrauterine route.       No current facility-administered medications for this visit.      She  has a past medical history of Allergy and Arrhythmia.    Social History and Family History were reviewed and updated.    ROS   No chest pain, no shortness of breath, no abdominal pain and all other systems were reviewed and are negative.       Objective:     /64 (BP Location: Right arm, Patient Position: Sitting, BP Cuff Size: Adult)   Pulse 64   Temp 36.3 °C (97.3 °F) (Temporal)   Resp 16   Ht 1.626 m (5' 4\")   Wt 54.9 kg (121 lb)   SpO2 98%  " Body mass index is 20.77 kg/m².   Physical Exam:  Constitutional: Alert, no distress.  Skin: Warm, dry, good turgor, no significant rashes noted today but she does have some these.  Blotchiness..  Eye: Equal, round and reactive, conjunctiva clear, lids normal.  ENMT: Lips without lesions, good dentition, oropharynx clear.  Neck: Trachea midline, no masses.   Lymph: No cervical or supraclavicular lymphadenopathy  Respiratory: Unlabored respiratory effort, lungs appear clear, no wheezes..  Cardiovascular: Normal S1, S2, no murmur, no edema.  Psych: Alert and oriented x3, normal affect and mood.        Assessment and Plan:   The following treatment plan was discussed    1. Heat rash  Acute, new onset condition.  Advised to protect herself from the sun.  Provided Kenalog cream to use as directed.  Referred to an allergist.  Advised if allergy will be unable to help her that she may notify me and I will refer to dermatology.  - triamcinolone acetonide (KENALOG) 0.5 % Cream; Apply sparingly three times daily PRN.  Dispense: 60 g; Refill: 3  - REFERRAL TO ALLERGY    2. Situational anxiety  Chronic condition.  Secondary to flying.  Prescribed Ativan 1 mg as needed.  Provided a 30 tablet supply.   reviewed.  Medication appropriate.  - LORazepam (ATIVAN) 1 MG Tab; Take 1 Tab by mouth every four hours as needed for Anxiety for up to 30 days.  Dispense: 30 Tab; Refill: 0      Followup: Return if symptoms worsen or fail to improve.    Please note that this dictation was created using voice recognition software. I have made every reasonable attempt to correct obvious errors, but I expect that there are errors of grammar and possibly content that I did not discover before finalizing the note.

## 2019-07-25 NOTE — ASSESSMENT & PLAN NOTE
This is a 23-year-old female who is leaving for [place] in August because of her fiancé's work.  She has noticed this year that she breaks out in a rash when she is out in the heat.  She believes when her skin is exposed to the son that she will get itching and then she will bruises help because she is itching so hard.  Last week it was on her legs.  She went hiking.  The other day it was on her arms.  She is using no creams.  The other week she broke out in hives.  She was interested in seeing a specialist.

## 2019-07-25 NOTE — ASSESSMENT & PLAN NOTE
Also still has situational anxiety relating to his traveling.  Has a history of an emergency landing that cause significant anxiety.  She takes Ativan as needed.  Last prescription was provided and filled in October last year.  She is not certain where she stands with her medication supply.  Is requesting a possible renewal because she is leaving town.

## 2019-09-23 ENCOUNTER — GYNECOLOGY VISIT (OUTPATIENT)
Dept: OBGYN | Facility: CLINIC | Age: 24
End: 2019-09-23
Payer: COMMERCIAL

## 2019-09-23 VITALS — WEIGHT: 119 LBS | SYSTOLIC BLOOD PRESSURE: 98 MMHG | DIASTOLIC BLOOD PRESSURE: 58 MMHG | BODY MASS INDEX: 20.43 KG/M2

## 2019-09-23 DIAGNOSIS — Z97.5 PRESENCE OF INTRAUTERINE CONTRACEPTIVE DEVICE (IUD): ICD-10-CM

## 2019-09-23 DIAGNOSIS — R10.31 RIGHT LOWER QUADRANT ABDOMINAL PAIN: ICD-10-CM

## 2019-09-23 PROCEDURE — 99213 OFFICE O/P EST LOW 20 MIN: CPT | Performed by: ADVANCED PRACTICE MIDWIFE

## 2019-09-23 NOTE — PROGRESS NOTES
Lupe Eaton is a 23 y.o. female who presents for abdominal pain        HPI Comments: Pt presents for abdominal pain and bloating for two weeks.  Patient's last menstrual period was 08/05/2019., reports brown discharge and spotting 8/30/19. Pt denies constipation and diarrhea. Reports having been seen in urgent care 9/14/19 and was treated with flagyl. Her pain is in RLQ and continues. Records are available on chart but do show no infection.    Patient also expresses concern regarding her IUD causing infertility issues. She was counseled on this at the urgent care facility and told to follow up with OB/GYN. This is her second Zaid IUD and she reports no problems until this point. Denies fever, headache or recent sick contacts.     Review of Systems   Pertinent positives documented in HPI and all other systems reviewed & are negative      Past Medical History:   Diagnosis Date   • Allergy    • Arrhythmia     unknown as a child        Medications:   Current Outpatient Medications Ordered in Epic   Medication Sig Dispense Refill   • triamcinolone acetonide (KENALOG) 0.5 % Cream Apply sparingly three times daily PRN. 60 g 3   • Levonorgestrel (ZAID) 13.5 MG IUD by Intrauterine route.       No current [facility]-ordered facility-administered medications on file.           Objective:   Vital measurements:  BP (!) 98/58   Wt 54 kg (119 lb)   Body mass index is 20.43 kg/m². (Goal BM I>18 <25)    Physical Exam   Nursing note and vitals reviewed.  Constitutional: She is oriented to person, place, and time. She appears well-developed and well-nourished. No distress.     Abdominal: Soft. Bowel sounds are normal. She exhibits no distension and no mass. She has tenderness with palpation of RLQ. Positive Rovsing's sign when illicited by myself and patient. Negative McBurney's sign, negative psoas sign.     Musculoskeletal: Normal range of motion. She exhibits no edema and no tenderness.     Lymphadenopathy: She has no  "cervical adenopathy.     Skin: Skin is warm and dry. No rash noted. She is not diaphoretic. No erythema. No pallor.     Psychiatric: She has a normal mood and affect. Her behavior is normal. Judgment and thought content normal.               Assessment:     1. Right lower quadrant abdominal pain  US-PELVIC COMPLETE (TRANSABDOMINAL/TRANSVAGINAL) (COMBO)    CANCELED: US-PELVIC TRANSVAGINAL ONLY   2. Presence of intrauterine contraceptive device (IUD)         Plan:   1. Discussed in detail with patient that at current, I am not concerned with IUD location. However, reassurance is desired related to recent counseling by outside provider. Order for transvaginal US provided to patient. She has long standing history of bowel/gut issues. This might be exacerbating her pain with palpation. At this time, transvaginal US and if normal, she will present to her PCP for additional evaluation. Current presentation is not that of an \"acute abdomen\". I have encouraged her to use probiotics and increase fiber in diet at this time. She is aware that if pain worsens or she develops fever, she is to present to ER.   2. Follow up PRN    "

## 2019-09-23 NOTE — NON-PROVIDER
Pt here for c/o discharge with IUD  Pt states wants an u/s to make sure IUD is in place, was seen at [facility] in [place] for abd pain and discharge. Was given flagyl. Discharge gone  Good#[phone]  Pharmacy verified

## 2019-09-24 ENCOUNTER — APPOINTMENT (OUTPATIENT)
Dept: RADIOLOGY | Facility: MEDICAL CENTER | Age: 24
End: 2019-09-24
Attending: ADVANCED PRACTICE MIDWIFE
Payer: COMMERCIAL

## 2019-09-24 DIAGNOSIS — R10.31 RIGHT LOWER QUADRANT ABDOMINAL PAIN: ICD-10-CM

## 2019-09-24 PROCEDURE — 76830 TRANSVAGINAL US NON-OB: CPT

## 2019-09-25 ENCOUNTER — TELEPHONE (OUTPATIENT)
Dept: OBGYN | Facility: CLINIC | Age: 24
End: 2019-09-25

## 2019-09-25 NOTE — TELEPHONE ENCOUNTER
Pt called requesting u/s results, was informed that results are in but they need to be review by a provider. I will call her back with an answer today.    Agreed to plan.      Per Dr. Raoul FRAGOSO. U/s WNL, IUD in place.      Pt notified, verbalized understanding.

## 2020-01-23 ENCOUNTER — OFFICE VISIT (OUTPATIENT)
Dept: MEDICAL GROUP | Facility: MEDICAL CENTER | Age: 25
End: 2020-01-23
Payer: COMMERCIAL

## 2020-01-23 VITALS
OXYGEN SATURATION: 98 % | DIASTOLIC BLOOD PRESSURE: 62 MMHG | RESPIRATION RATE: 16 BRPM | TEMPERATURE: 97.9 F | SYSTOLIC BLOOD PRESSURE: 106 MMHG | HEART RATE: 90 BPM | BODY MASS INDEX: 20.66 KG/M2 | WEIGHT: 121 LBS | HEIGHT: 64 IN

## 2020-01-23 DIAGNOSIS — F41.8 SITUATIONAL ANXIETY: ICD-10-CM

## 2020-01-23 DIAGNOSIS — L70.0 ACNE VULGARIS: ICD-10-CM

## 2020-01-23 DIAGNOSIS — L74.0 HEAT RASH: ICD-10-CM

## 2020-01-23 PROCEDURE — 99214 OFFICE O/P EST MOD 30 MIN: CPT | Performed by: PHYSICIAN ASSISTANT

## 2020-01-23 RX ORDER — DOXYCYCLINE HYCLATE 100 MG
100 TABLET ORAL 2 TIMES DAILY
Qty: 28 TAB | Refills: 0 | Status: SHIPPED | OUTPATIENT
Start: 2020-01-23 | End: 2020-04-14

## 2020-01-23 RX ORDER — CLINDAMYCIN PHOSPHATE 11.9 MG/ML
SOLUTION TOPICAL
Qty: 1 BOTTLE | Refills: 0 | Status: SHIPPED | OUTPATIENT
Start: 2020-01-23 | End: 2020-04-14

## 2020-01-23 ASSESSMENT — PATIENT HEALTH QUESTIONNAIRE - PHQ9: CLINICAL INTERPRETATION OF PHQ2 SCORE: 0

## 2020-01-24 NOTE — PROGRESS NOTES
"Subjective:   Lupe Eaton is a 24 y.o. female here today for acne vulgaris, situational anxiety and history of a heat rash.    Acne vulgaris  This is a 24-year-old female complains of a 3 to 4-week history of acne on her face.  Started on the cheek area.  Bilaterally.  Complains of burning and stinging.  Has some pictures of it on her phone.  Currently she is wearing make-up.  After 1 week the area, dried up and now she just has lesions.  She has been using an online dermatology site to help with her treatment.  It was advised to start her spironolactone.  She does have a history of heat rashes but denies any recent flares of that.  That was her not on her face.  Typically that will occur in the summer or when she has heat on her.    Situational anxiety  Chronic condition when flying secondary to an emergency landing.  Takes Ativan when needed and usually will sleep on the plane ride.  Does not need any renewal of medication.       Current medicines (including changes today)  Current Outpatient Medications   Medication Sig Dispense Refill   • clindamycin (CLEOCIN) 1 % Solution Apply twice daily. 1 Bottle 0   • doxycycline (VIBRAMYCIN) 100 MG Tab Take 1 Tab by mouth 2 times a day. 28 Tab 0   • triamcinolone acetonide (KENALOG) 0.5 % Cream Apply sparingly three times daily PRN. 60 g 3   • Levonorgestrel (ZAID) 13.5 MG IUD by Intrauterine route.       No current facility-administered medications for this visit.      She  has a past medical history of Allergy and Arrhythmia.    Social History and Family History were reviewed and updated.    ROS   No chest pain, no shortness of breath, no abdominal pain and all other systems were reviewed and are negative.       Objective:     /62 (BP Location: Right arm, Patient Position: Sitting, BP Cuff Size: Adult)   Pulse 90   Temp 36.6 °C (97.9 °F) (Temporal)   Resp 16   Ht 1.626 m (5' 4\")   Wt 54.9 kg (121 lb)   SpO2 98%  Body mass index is 20.77 kg/m². "   Physical Exam:  Constitutional: Alert, no distress.  Skin: Warm, dry, good turgor.  Face is covered with some make-up but otherwise to note many lesions on the face bilaterally.  Eye: Equal, round and reactive, conjunctiva clear, lids normal.  ENMT: Lips without lesions, good dentition, oropharynx clear.  Neck: Trachea midline, no masses.   Lymph: No cervical or supraclavicular lymphadenopathy  Respiratory: Unlabored respiratory effort, lungs appear clear, no wheezes.  Cardiovascular: Regular rate and rhythm.  Psych: Alert and oriented x3, normal affect and mood.        Assessment and Plan:   The following treatment plan was discussed    1. Acne vulgaris  Acute, new onset condition.  Could also be an allergic reaction also could be perioral dermatitis but there does not appear to be any lesions around the mouth.  Refer to dermatology.  Also will try a trial of Cleocin 1% solution to apply twice daily for approximate 1 week.  If no improvement may start doxycycline 100 mg twice daily.  Advised to contact me through my chart with any concerns about improvement or not.  - clindamycin (CLEOCIN) 1 % Solution; Apply twice daily.  Dispense: 1 Bottle; Refill: 0  - doxycycline (VIBRAMYCIN) 100 MG Tab; Take 1 Tab by mouth 2 times a day.  Dispense: 28 Tab; Refill: 0  - REFERRAL TO DERMATOLOGY    2. Heat rash  Chronic condition.  No recent exacerbation per patient.    3. Situational anxiety  Chronic condition.  No recent travel.  Ativan refills not needed.      Followup: Return if symptoms worsen or fail to improve.    Please note that this dictation was created using voice recognition software. I have made every reasonable attempt to correct obvious errors, but I expect that there are errors of grammar and possibly content that I did not discover before finalizing the note.

## 2020-01-24 NOTE — ASSESSMENT & PLAN NOTE
This is a 24-year-old female complains of a 3 to 4-week history of acne on her face.  Started on the cheek area.  Bilaterally.  Complains of burning and stinging.  Has some pictures of it on her phone.  Currently she is wearing make-up.  After 1 week the area, dried up and now she just has lesions.  She has been using an online dermatology site to help with her treatment.  It was advised to start her spironolactone.  She does have a history of heat rashes but denies any recent flares of that.  That was her not on her face.  Typically that will occur in the summer or when she has heat on her.

## 2020-01-24 NOTE — ASSESSMENT & PLAN NOTE
Chronic condition when flying secondary to an emergency landing.  Takes Ativan when needed and usually will sleep on the plane ride.  Does not need any renewal of medication.

## 2020-05-04 ENCOUNTER — GYNECOLOGY VISIT (OUTPATIENT)
Dept: OBGYN | Facility: CLINIC | Age: 25
End: 2020-05-04
Payer: COMMERCIAL

## 2020-05-04 VITALS — WEIGHT: 121 LBS | DIASTOLIC BLOOD PRESSURE: 72 MMHG | SYSTOLIC BLOOD PRESSURE: 121 MMHG | BODY MASS INDEX: 20.77 KG/M2

## 2020-05-04 DIAGNOSIS — Z97.5 IUD (INTRAUTERINE DEVICE) IN PLACE: ICD-10-CM

## 2020-05-04 DIAGNOSIS — L70.0 ACNE VULGARIS: ICD-10-CM

## 2020-05-04 DIAGNOSIS — N92.1 BREAKTHROUGH BLEEDING ASSOCIATED WITH INTRAUTERINE DEVICE (IUD): ICD-10-CM

## 2020-05-04 DIAGNOSIS — N94.6 DYSMENORRHEA: ICD-10-CM

## 2020-05-04 DIAGNOSIS — Z97.5 BREAKTHROUGH BLEEDING ASSOCIATED WITH INTRAUTERINE DEVICE (IUD): ICD-10-CM

## 2020-05-04 PROCEDURE — 99213 OFFICE O/P EST LOW 20 MIN: CPT | Performed by: OBSTETRICS & GYNECOLOGY

## 2020-05-04 NOTE — PROGRESS NOTES
Subjective:      Lupe Eaton is a 24 y.o. female who presents for Gynecologic f/u (Side effacts of isreal)            HPI patient is a 24-year-old  0 who presents today to discuss symptoms from Isreal IUD.  Patient states she had her second Isreal IUD placed about a year ago and has been having breakthrough bleeding, dysmenorrhea and acne breakouts.  Patient states she did not have any of the symptoms with her first IUD.  She is able to palpate IUD string.  Denies any dyspareunia.  She reports some left lower quadrant tenderness and dysmenorrhea symptoms during menstruation.  Denies any postcoital bleeding or spotting.  She has no pain when she is not having menstrual bleeding.  Patient reports normal bowel and bladder functions.  She states she has had recent significant acne and was started on spironolactone a couple weeks ago by her dermatologist and she has noticed some improvement in her acne breakout.  She states she is using long-acting reversible form of contraception because she does not remember to take pills daily.    ROS all organ systems are reviewed and were negative except for complaints in HPI       Objective:     /72   Wt 54.9 kg (121 lb)   LMP 2020   BMI 20.77 kg/m²      Physical Exam  Vitals signs and nursing note reviewed. Exam conducted with a chaperone present.   Constitutional:       General: She is not in acute distress.     Appearance: Normal appearance. She is not toxic-appearing.   Neurological:      Mental Status: She is alert and oriented to person, place, and time. Mental status is at baseline.      Gait: Gait normal.   Psychiatric:         Mood and Affect: Mood normal.         Behavior: Behavior normal.         Thought Content: Thought content normal.         Judgment: Judgment normal.     Patient declined pelvic exam       Discussion:    We discussed Isreal IUD and possible side effects including breakthrough bleeding, dysmenorrhea and possible skin  changes.  We discussed treatment options for breakthrough bleeding and dysmenorrhea including ibuprofen therapy and patient states she will try this therapy.  She has had a lot of skin breakout including acne which is responsive to Spironolactone I advised patient that she should not remove her IUD at this point and we can reassess her symptoms in a month to see if there is improvement and she can make a decision at that point.  I discussed contraception options including OCP, hormonal or nonhormonal IUD, patches, vaginal ring, injections, condoms with spermicide options and I discussed failure rates, potential bleeding changes and side effects with all of these treatment options.  All questions and concerns were addressed.  Patient states she will continue with her Yana IUD and will follow-up in 1 month for reassessment.     Assessment/Plan:       1. IUD (intrauterine device) in place  Patient has Yana IUD in place.  States she is able to palpate IUD strings    2. Breakthrough bleeding associated with intrauterine device (IUD)  We discussed treatment options for breakthrough bleeding.  Patient will try ibuprofen therapy    3. Dysmenorrhea  We discussed treatment options.  Patient will try ibuprofen therapy and follow-up for reassessment    4. Acne vulgaris  Patient states she was started on spironolactone a couple weeks ago by dermatologist and reports significant improvement.  She will continue therapy and follow-up with dermatology.    5.  Precautions and plan of care reviewed.  Patient to follow-up in 1 month.  20 minutes was spent with patient today.  All time was for face-to-face counseling regarding diagnosis and treatment.

## 2020-05-22 ENCOUNTER — HOSPITAL ENCOUNTER (OUTPATIENT)
Dept: LAB | Facility: MEDICAL CENTER | Age: 25
End: 2020-05-22
Attending: PHYSICIAN ASSISTANT
Payer: COMMERCIAL

## 2020-05-22 LAB
ALBUMIN SERPL BCP-MCNC: 4.6 G/DL (ref 3.2–4.9)
ALP SERPL-CCNC: 57 U/L (ref 30–99)
ALT SERPL-CCNC: 12 U/L (ref 2–50)
AST SERPL-CCNC: 19 U/L (ref 12–45)
B-HCG SERPL-ACNC: <1 MIU/ML (ref 0–5)
BASOPHILS # BLD AUTO: 0.6 % (ref 0–1.8)
BASOPHILS # BLD: 0.03 K/UL (ref 0–0.12)
BILIRUB CONJ SERPL-MCNC: <0.2 MG/DL (ref 0.1–0.5)
BILIRUB INDIRECT SERPL-MCNC: NORMAL MG/DL (ref 0–1)
BILIRUB SERPL-MCNC: 0.3 MG/DL (ref 0.1–1.5)
CHOLEST SERPL-MCNC: 214 MG/DL (ref 100–199)
EOSINOPHIL # BLD AUTO: 0.08 K/UL (ref 0–0.51)
EOSINOPHIL NFR BLD: 1.6 % (ref 0–6.9)
ERYTHROCYTE [DISTWIDTH] IN BLOOD BY AUTOMATED COUNT: 42.5 FL (ref 35.9–50)
FASTING STATUS PATIENT QL REPORTED: NORMAL
HCT VFR BLD AUTO: 41.5 % (ref 37–47)
HDLC SERPL-MCNC: 74 MG/DL
HGB BLD-MCNC: 13.6 G/DL (ref 12–16)
IMM GRANULOCYTES # BLD AUTO: 0 K/UL (ref 0–0.11)
IMM GRANULOCYTES NFR BLD AUTO: 0 % (ref 0–0.9)
LDLC SERPL CALC-MCNC: 128 MG/DL
LYMPHOCYTES # BLD AUTO: 1.89 K/UL (ref 1–4.8)
LYMPHOCYTES NFR BLD: 38.3 % (ref 22–41)
MCH RBC QN AUTO: 29.8 PG (ref 27–33)
MCHC RBC AUTO-ENTMCNC: 32.8 G/DL (ref 33.6–35)
MCV RBC AUTO: 90.8 FL (ref 81.4–97.8)
MONOCYTES # BLD AUTO: 0.28 K/UL (ref 0–0.85)
MONOCYTES NFR BLD AUTO: 5.7 % (ref 0–13.4)
NEUTROPHILS # BLD AUTO: 2.65 K/UL (ref 2–7.15)
NEUTROPHILS NFR BLD: 53.8 % (ref 44–72)
NRBC # BLD AUTO: 0 K/UL
NRBC BLD-RTO: 0 /100 WBC
PLATELET # BLD AUTO: 246 K/UL (ref 164–446)
PMV BLD AUTO: 12.6 FL (ref 9–12.9)
PROT SERPL-MCNC: 7.6 G/DL (ref 6–8.2)
RBC # BLD AUTO: 4.57 M/UL (ref 4.2–5.4)
TRIGL SERPL-MCNC: 62 MG/DL (ref 0–149)
WBC # BLD AUTO: 4.9 K/UL (ref 4.8–10.8)

## 2020-05-22 PROCEDURE — 83721 ASSAY OF BLOOD LIPOPROTEIN: CPT

## 2020-05-22 PROCEDURE — 85025 COMPLETE CBC W/AUTO DIFF WBC: CPT

## 2020-05-22 PROCEDURE — 80061 LIPID PANEL: CPT

## 2020-05-22 PROCEDURE — 84702 CHORIONIC GONADOTROPIN TEST: CPT

## 2020-05-22 PROCEDURE — 80076 HEPATIC FUNCTION PANEL: CPT

## 2020-05-22 PROCEDURE — 36415 COLL VENOUS BLD VENIPUNCTURE: CPT

## 2020-05-24 LAB — LDLC SERPL-MCNC: 138 MG/DL (ref 0–129)

## 2020-06-23 ENCOUNTER — HOSPITAL ENCOUNTER (OUTPATIENT)
Facility: MEDICAL CENTER | Age: 25
End: 2020-06-23
Attending: PHYSICIAN ASSISTANT
Payer: COMMERCIAL

## 2020-06-23 LAB — HCG UR QL: NEGATIVE

## 2020-06-23 PROCEDURE — 81025 URINE PREGNANCY TEST: CPT

## 2020-07-20 DIAGNOSIS — L50.9 HIVES: ICD-10-CM

## 2020-07-20 DIAGNOSIS — L74.0 HEAT RASH: ICD-10-CM

## 2020-07-24 ENCOUNTER — HOSPITAL ENCOUNTER (OUTPATIENT)
Dept: LAB | Facility: MEDICAL CENTER | Age: 25
End: 2020-07-24
Attending: PHYSICIAN ASSISTANT
Payer: MEDICAID

## 2020-07-24 LAB
ALBUMIN SERPL BCP-MCNC: 4.5 G/DL (ref 3.2–4.9)
ALP SERPL-CCNC: 57 U/L (ref 30–99)
ALT SERPL-CCNC: 16 U/L (ref 2–50)
AST SERPL-CCNC: 20 U/L (ref 12–45)
B-HCG SERPL-ACNC: <1 MIU/ML (ref 0–5)
BASOPHILS # BLD AUTO: 0.4 % (ref 0–1.8)
BASOPHILS # BLD: 0.02 K/UL (ref 0–0.12)
BILIRUB CONJ SERPL-MCNC: <0.2 MG/DL (ref 0.1–0.5)
BILIRUB INDIRECT SERPL-MCNC: NORMAL MG/DL (ref 0–1)
BILIRUB SERPL-MCNC: 0.3 MG/DL (ref 0.1–1.5)
CHOLEST SERPL-MCNC: 217 MG/DL (ref 100–199)
EOSINOPHIL # BLD AUTO: 0.11 K/UL (ref 0–0.51)
EOSINOPHIL NFR BLD: 1.9 % (ref 0–6.9)
ERYTHROCYTE [DISTWIDTH] IN BLOOD BY AUTOMATED COUNT: 42.1 FL (ref 35.9–50)
FASTING STATUS PATIENT QL REPORTED: NORMAL
HCT VFR BLD AUTO: 39.6 % (ref 37–47)
HDLC SERPL-MCNC: 59 MG/DL
HGB BLD-MCNC: 13.1 G/DL (ref 12–16)
IMM GRANULOCYTES # BLD AUTO: 0.01 K/UL (ref 0–0.11)
IMM GRANULOCYTES NFR BLD AUTO: 0.2 % (ref 0–0.9)
LDLC SERPL CALC-MCNC: 132 MG/DL
LYMPHOCYTES # BLD AUTO: 2.08 K/UL (ref 1–4.8)
LYMPHOCYTES NFR BLD: 36.5 % (ref 22–41)
MCH RBC QN AUTO: 29.9 PG (ref 27–33)
MCHC RBC AUTO-ENTMCNC: 33.1 G/DL (ref 33.6–35)
MCV RBC AUTO: 90.4 FL (ref 81.4–97.8)
MONOCYTES # BLD AUTO: 0.33 K/UL (ref 0–0.85)
MONOCYTES NFR BLD AUTO: 5.8 % (ref 0–13.4)
NEUTROPHILS # BLD AUTO: 3.15 K/UL (ref 2–7.15)
NEUTROPHILS NFR BLD: 55.2 % (ref 44–72)
NRBC # BLD AUTO: 0 K/UL
NRBC BLD-RTO: 0 /100 WBC
PLATELET # BLD AUTO: 268 K/UL (ref 164–446)
PMV BLD AUTO: 12.3 FL (ref 9–12.9)
PROT SERPL-MCNC: 7.1 G/DL (ref 6–8.2)
RBC # BLD AUTO: 4.38 M/UL (ref 4.2–5.4)
TRIGL SERPL-MCNC: 130 MG/DL (ref 0–149)
WBC # BLD AUTO: 5.7 K/UL (ref 4.8–10.8)

## 2020-07-24 PROCEDURE — 36415 COLL VENOUS BLD VENIPUNCTURE: CPT

## 2020-07-24 PROCEDURE — 80076 HEPATIC FUNCTION PANEL: CPT

## 2020-07-24 PROCEDURE — 85025 COMPLETE CBC W/AUTO DIFF WBC: CPT

## 2020-07-24 PROCEDURE — 80061 LIPID PANEL: CPT

## 2020-07-24 PROCEDURE — 84702 CHORIONIC GONADOTROPIN TEST: CPT

## 2020-07-30 ENCOUNTER — APPOINTMENT (OUTPATIENT)
Dept: RADIOLOGY | Facility: IMAGING CENTER | Age: 25
End: 2020-07-30
Attending: FAMILY MEDICINE
Payer: MEDICAID

## 2020-07-30 ENCOUNTER — OFFICE VISIT (OUTPATIENT)
Dept: URGENT CARE | Facility: CLINIC | Age: 25
End: 2020-07-30
Payer: MEDICAID

## 2020-07-30 VITALS
SYSTOLIC BLOOD PRESSURE: 122 MMHG | HEART RATE: 79 BPM | TEMPERATURE: 98.6 F | BODY MASS INDEX: 21 KG/M2 | OXYGEN SATURATION: 98 % | WEIGHT: 123 LBS | RESPIRATION RATE: 16 BRPM | DIASTOLIC BLOOD PRESSURE: 80 MMHG | HEIGHT: 64 IN

## 2020-07-30 DIAGNOSIS — M54.2 NECK PAIN: ICD-10-CM

## 2020-07-30 PROCEDURE — 72040 X-RAY EXAM NECK SPINE 2-3 VW: CPT | Mod: TC | Performed by: FAMILY MEDICINE

## 2020-07-30 PROCEDURE — 99214 OFFICE O/P EST MOD 30 MIN: CPT | Performed by: FAMILY MEDICINE

## 2020-07-30 RX ORDER — METHOCARBAMOL 500 MG/1
500 TABLET, FILM COATED ORAL 3 TIMES DAILY PRN
Qty: 20 TAB | Refills: 0 | Status: SHIPPED | OUTPATIENT
Start: 2020-07-30

## 2020-07-30 SDOH — HEALTH STABILITY: MENTAL HEALTH: HOW OFTEN DO YOU HAVE A DRINK CONTAINING ALCOHOL?: 2-4 TIMES A MONTH

## 2020-07-30 ASSESSMENT — FIBROSIS 4 INDEX: FIB4 SCORE: 0.49

## 2020-07-31 NOTE — PATIENT INSTRUCTIONS
Your x-ray was negative for acute abnormalities.  Icing or heat as needed  Over-the-counter Aleve 1 or 2 tablet twice daily as needed with food for pain.  Muscle relaxer every 8 hours as needed but caution is advised because it can be sedating so do not drink and drive while on this medication  Gentle stretching.  Avoid any spinal manipulation  Follow-up in a week if not significant better, sooner if any worsening.    For more information see the handout below        Acute Torticollis, Adult  Torticollis is a condition in which the muscles of the neck tighten (contract) abnormally, causing the neck to twist and the head to move into an unnatural position. Torticollis that develops suddenly is called acute torticollis. People with acute torticollis may have trouble turning their head. The condition can be painful and may range from mild to severe.  What are the causes?  This condition may be caused by:  · Sleeping in an awkward position (common).  · Extending or twisting the neck muscles beyond their normal position.  · An injury to the neck muscles.  · An infection.  · A tumor.  · Certain medicines.  · Long-lasting spasms of the neck muscles.  In some cases, the cause may not be known.  What increases the risk?  You are more likely to develop this condition if:  · You have a condition associated with loose ligaments, such as Down syndrome.  · You have a brain condition that affects vision, such as strabismus.  What are the signs or symptoms?  The main symptom of this condition is tilting of the head to one side. Other symptoms include:  · Pain in the neck.  · Trouble turning the head from side to side or up and down.  How is this diagnosed?  This condition may be diagnosed based on:  · A physical exam.  · Your medical history.  · Imaging tests, such as:  ? An X-ray.  ? An ultrasound.  ? A CT scan.  ? An MRI.  How is this treated?  Treatment for this condition depends on what is causing the condition. Mild cases may  go away without treatment. Treatment for more serious cases may include:  · Medicines or shots to relax the muscles.  · Other medicines, such as antibiotics to treat the underlying cause.  · Wearing a soft neck collar.  · Physical therapy and stretching to improve neck strength and flexibility.  · Neck massage.  In severe cases, surgery may be needed to repair dislocated or broken bones or to treat nerves in the neck.  Follow these instructions at home:    · Take over-the-counter and prescription medicines only as told by your health care provider.  · Do stretching exercises and massage your neck as told by your health care provider.  · If directed, apply heat to the affected area as often as told by your health care provider. Use the heat source that your health care provider recommends, such as a moist heat pack or a heating pad.  ? Place a towel between your skin and the heat source.  ? Leave the heat on for 20-30 minutes.  ? Remove the heat if your skin turns bright red. This is especially important if you are unable to feel pain, heat, or cold. You may have a greater risk of getting burned.  · If you wake up with torticollis after sleeping, check your bed or sleeping area. Look for lumpy pillows or unusual objects. Make sure your bed and sleeping area are comfortable.  · Keep all follow-up visits as told by your health care provider. This is important.  Contact a health care provider if:  · You have a fever.  · Your symptoms do not improve or they get worse.  Get help right away if:  · You have trouble breathing.  · You develop noisy breathing (stridor).  · You start to drool.  · You have trouble swallowing or pain when swallowing.  · You develop numbness or weakness in your hands or feet.  · You have changes in your speech, understanding, or vision.  · You are in severe pain.  · You cannot move your head or neck.  Summary  · Torticollis is a condition in which the muscles of the neck tighten (contract)  abnormally, causing the neck to twist and the head to move into an unnatural position. Torticollis that develops suddenly is called acute torticollis.  · Treatment for this condition depends on what is causing the condition. Mild cases may go away without treatment.  · Do stretching exercises and massage your neck as told by your health care provider. You may also be instructed to apply heat to the area.  · Contact your health care provider if your symptoms do not improve or they get worse.  This information is not intended to replace advice given to you by your health care provider. Make sure you discuss any questions you have with your health care provider.  Document Released: 12/15/2001 Document Revised: 11/30/2018 Document Reviewed: 02/15/2018  Elsevier Patient Education © 2020 [employer]

## 2020-08-01 ASSESSMENT — ENCOUNTER SYMPTOMS
SENSORY CHANGE: 0
WEAKNESS: 0
PARESIS: 0
NECK PAIN: 1
FOCAL WEAKNESS: 0
FEVER: 0
TINGLING: 0
NUMBNESS: 0
HEADACHES: 0
DIZZINESS: 0

## 2020-08-01 NOTE — PROGRESS NOTES
"Subjective:      Lupe Eaton is a 24 y.o. female who presents with Neck Pain (x 2 seeks)            Neck Pain    This is a new problem. Episode onset: 2 weeks. The problem occurs constantly. The problem has been unchanged. The pain is associated with nothing. The pain is present in the right side. The quality of the pain is described as aching. The pain is at a severity of 4/10. The pain is moderate. Nothing aggravates the symptoms. The pain is same all the time. Pertinent negatives include no fever, headaches, numbness, paresis, tingling or weakness. Treatments tried: over the counter medication and she has gone to Chiropractor x 5 and got adjusted, but no help. she has not had any xrays though and she wants one. No injuries reported. The treatment provided no relief.       Review of Systems   Constitutional: Negative for fever.   Musculoskeletal: Positive for neck pain.   Neurological: Negative for dizziness, tingling, sensory change, focal weakness, weakness, numbness and headaches.        No radiculopathy   All other systems reviewed and are negative.         Objective:     /80   Pulse 79   Temp 37 °C (98.6 °F) (Temporal)   Resp 16   Ht 1.626 m (5' 4\")   Wt 55.8 kg (123 lb)   SpO2 98%   BMI 21.11 kg/m²      Physical Exam  Constitutional:       General: She is not in acute distress.     Appearance: She is not ill-appearing, toxic-appearing or diaphoretic.   HENT:      Head: Normocephalic and atraumatic.      Right Ear: External ear normal.      Left Ear: External ear normal.   Eyes:      Conjunctiva/sclera: Conjunctivae normal.   Neck:      Musculoskeletal: Decreased range of motion. Pain with movement, torticollis (mild, decreased side rotation) and muscular tenderness (on the right side) present. No edema, erythema, crepitus, injury or spinous process tenderness.   Cardiovascular:      Rate and Rhythm: Normal rate.   Pulmonary:      Effort: Pulmonary effort is normal. No " respiratory distress.      Breath sounds: No stridor.   Skin:     General: Skin is warm.      Coloration: Skin is not jaundiced or pale.   Neurological:      Mental Status: She is alert and oriented to person, place, and time.      Sensory: Sensation is intact.      Motor: Motor function is intact.      Deep Tendon Reflexes: Reflexes normal.   Psychiatric:         Mood and Affect: Mood normal.       Cervical Xray is normal          Assessment/Plan:   ASSESSMENT:PLAN:  1. Neck pain  - DX-CERVICAL SPINE-2 OR 3 VIEWS; Future  - methocarbamol (ROBAXIN) 500 MG Tab; Take 1 Tab by mouth 3 times a day as needed.  Dispense: 20 Tab; Refill: 0    Reassured xray is negative  No spinal manipulation recommended  Trial of heat, icing and NSAD's discussed and also muscle relaxer  Follow up if not significantly improved as expected, sooner if any worsening or new symptoms  Warning signs reviewed

## 2020-09-25 ENCOUNTER — APPOINTMENT (OUTPATIENT)
Dept: LAB | Facility: MEDICAL CENTER | Age: 25
End: 2020-09-25
Payer: MEDICAID

## 2020-10-27 ENCOUNTER — HOSPITAL ENCOUNTER (OUTPATIENT)
Dept: LAB | Facility: MEDICAL CENTER | Age: 25
End: 2020-10-27
Attending: PHYSICIAN ASSISTANT
Payer: MEDICAID

## 2020-10-27 LAB
ALBUMIN SERPL BCP-MCNC: 4.8 G/DL (ref 3.2–4.9)
ALP SERPL-CCNC: 71 U/L (ref 30–99)
ALT SERPL-CCNC: 10 U/L (ref 2–50)
AST SERPL-CCNC: 17 U/L (ref 12–45)
BASOPHILS # BLD AUTO: 0.8 % (ref 0–1.8)
BASOPHILS # BLD: 0.05 K/UL (ref 0–0.12)
BILIRUB CONJ SERPL-MCNC: <0.2 MG/DL (ref 0.1–0.5)
BILIRUB INDIRECT SERPL-MCNC: NORMAL MG/DL (ref 0–1)
BILIRUB SERPL-MCNC: 0.3 MG/DL (ref 0.1–1.5)
CHOLEST SERPL-MCNC: 234 MG/DL (ref 100–199)
EOSINOPHIL # BLD AUTO: 0.05 K/UL (ref 0–0.51)
EOSINOPHIL NFR BLD: 0.8 % (ref 0–6.9)
ERYTHROCYTE [DISTWIDTH] IN BLOOD BY AUTOMATED COUNT: 41.7 FL (ref 35.9–50)
FASTING STATUS PATIENT QL REPORTED: NORMAL
HCG UR QL: NEGATIVE
HCT VFR BLD AUTO: 42.7 % (ref 37–47)
HDLC SERPL-MCNC: 77 MG/DL
HGB BLD-MCNC: 14 G/DL (ref 12–16)
IMM GRANULOCYTES # BLD AUTO: 0.01 K/UL (ref 0–0.11)
IMM GRANULOCYTES NFR BLD AUTO: 0.2 % (ref 0–0.9)
LDLC SERPL CALC-MCNC: 140 MG/DL
LYMPHOCYTES # BLD AUTO: 2.17 K/UL (ref 1–4.8)
LYMPHOCYTES NFR BLD: 34.1 % (ref 22–41)
MCH RBC QN AUTO: 30 PG (ref 27–33)
MCHC RBC AUTO-ENTMCNC: 32.8 G/DL (ref 33.6–35)
MCV RBC AUTO: 91.4 FL (ref 81.4–97.8)
MONOCYTES # BLD AUTO: 0.33 K/UL (ref 0–0.85)
MONOCYTES NFR BLD AUTO: 5.2 % (ref 0–13.4)
NEUTROPHILS # BLD AUTO: 3.75 K/UL (ref 2–7.15)
NEUTROPHILS NFR BLD: 58.9 % (ref 44–72)
NRBC # BLD AUTO: 0 K/UL
NRBC BLD-RTO: 0 /100 WBC
PLATELET # BLD AUTO: 280 K/UL (ref 164–446)
PMV BLD AUTO: 12.3 FL (ref 9–12.9)
PROT SERPL-MCNC: 7.6 G/DL (ref 6–8.2)
RBC # BLD AUTO: 4.67 M/UL (ref 4.2–5.4)
TRIGL SERPL-MCNC: 84 MG/DL (ref 0–149)
WBC # BLD AUTO: 6.4 K/UL (ref 4.8–10.8)

## 2020-10-27 PROCEDURE — 36415 COLL VENOUS BLD VENIPUNCTURE: CPT

## 2020-10-27 PROCEDURE — 83721 ASSAY OF BLOOD LIPOPROTEIN: CPT

## 2020-10-27 PROCEDURE — 85025 COMPLETE CBC W/AUTO DIFF WBC: CPT

## 2020-10-27 PROCEDURE — 80076 HEPATIC FUNCTION PANEL: CPT

## 2020-10-27 PROCEDURE — 80061 LIPID PANEL: CPT

## 2020-10-27 PROCEDURE — 81025 URINE PREGNANCY TEST: CPT

## 2020-10-29 LAB — LDLC SERPL-MCNC: 142 MG/DL (ref 0–129)

## 2023-12-22 ENCOUNTER — OFFICE VISIT (OUTPATIENT)
Dept: URGENT CARE | Facility: CLINIC | Age: 28
End: 2023-12-22
Payer: MEDICAID

## 2023-12-22 VITALS
TEMPERATURE: 96.6 F | HEIGHT: 64 IN | RESPIRATION RATE: 12 BRPM | HEART RATE: 72 BPM | OXYGEN SATURATION: 98 % | BODY MASS INDEX: 22.93 KG/M2 | SYSTOLIC BLOOD PRESSURE: 112 MMHG | DIASTOLIC BLOOD PRESSURE: 58 MMHG | WEIGHT: 134.3 LBS

## 2023-12-22 DIAGNOSIS — R20.2 TINGLING OF LEFT UPPER EXTREMITY: ICD-10-CM

## 2023-12-22 DIAGNOSIS — R00.1 BRADYCARDIA: ICD-10-CM

## 2023-12-22 LAB — GLUCOSE BLD-MCNC: 92 MG/DL (ref 65–99)

## 2023-12-22 PROCEDURE — 3074F SYST BP LT 130 MM HG: CPT | Performed by: PHYSICIAN ASSISTANT

## 2023-12-22 PROCEDURE — 82962 GLUCOSE BLOOD TEST: CPT | Performed by: PHYSICIAN ASSISTANT

## 2023-12-22 PROCEDURE — 3078F DIAST BP <80 MM HG: CPT | Performed by: PHYSICIAN ASSISTANT

## 2023-12-22 PROCEDURE — 99204 OFFICE O/P NEW MOD 45 MIN: CPT | Performed by: PHYSICIAN ASSISTANT

## 2023-12-22 PROCEDURE — 93000 ELECTROCARDIOGRAM COMPLETE: CPT | Performed by: PHYSICIAN ASSISTANT

## 2023-12-22 ASSESSMENT — ENCOUNTER SYMPTOMS
SENSORY CHANGE: 0
SPEECH CHANGE: 0
WEAKNESS: 0
DOUBLE VISION: 0
PALPITATIONS: 0
TREMORS: 0
TINGLING: 0
BLURRED VISION: 0
DIZZINESS: 0
FOCAL WEAKNESS: 0
HEADACHES: 1

## 2023-12-22 NOTE — PROGRESS NOTES
Subjective:   Lupe Eaton is a 28 y.o. female who presents today with   Chief Complaint   Patient presents with    Other      Pt has a tingling feeling on the left side of head, tenderness, tingling on fingers x 3 weeks off and on     Other  This is a new problem. The current episode started 1 to 4 weeks ago. The problem occurs constantly. The problem has been unchanged. Associated symptoms include headaches. Pertinent negatives include no chest pain or weakness. She has tried NSAIDs for the symptoms. The treatment provided mild relief.     Patient states symptoms seem to happen intermittently.  She has tingling on the left side of her head and sometimes headache and pressure but not this time.  She also notes some tingling on the fingers of her left hand.  Patient states she has an MRI ordered in the next couple weeks with her primary care.  History of cervicogenic headache she was seen in [facility].    PMH:  has a past medical history of Allergy and Arrhythmia.  MEDS:   Current Outpatient Medications:     Multiple Vitamin (MULTI-VITAMINS PO), Take  by mouth., Disp: , Rfl:     isotretinoin (ACCUTANE) 40 MG capsule, TAKE 1 CAPSULE BY MOUTH EVERY DAY WITH DINNER (Patient not taking: Reported on 12/22/2023), Disp: , Rfl:     methocarbamol (ROBAXIN) 500 MG Tab, Take 1 Tab by mouth 3 times a day as needed. (Patient not taking: Reported on 12/22/2023), Disp: 20 Tab, Rfl: 0    triamcinolone acetonide (KENALOG) 0.5 % Cream, Apply sparingly three times daily PRN. (Patient not taking: Reported on 12/22/2023), Disp: 60 g, Rfl: 3    Levonorgestrel (ZAID) 13.5 MG IUD, by Intrauterine route. (Patient not taking: Reported on 12/22/2023), Disp: , Rfl:   ALLERGIES:   Allergies   Allergen Reactions    Amoxicillin Vomiting and Nausea     SURGHX: No past surgical history on file.  SOCHX:  reports that she has never smoked. She has never used smokeless tobacco. She reports current alcohol use of about 1.0 oz of alcohol per  "week. She reports that she does not use drugs.  FH: Reviewed with patient, not pertinent to this visit.     Review of Systems   Eyes:  Negative for blurred vision and double vision.   Cardiovascular:  Negative for chest pain and palpitations.   Neurological:  Positive for headaches. Negative for dizziness, tingling, tremors, sensory change, speech change, focal weakness and weakness.      Objective:   /58 (BP Location: Left arm, Patient Position: Sitting, BP Cuff Size: Adult)   Pulse 72   Temp 35.9 °C (96.6 °F) (Temporal)   Resp 12   Ht 1.626 m (5' 4\")   Wt 60.9 kg (134 lb 4.8 oz)   SpO2 98%   BMI 23.05 kg/m²   Physical Exam  Vitals and nursing note reviewed.   Constitutional:       General: She is not in acute distress.     Appearance: Normal appearance. She is well-developed. She is not ill-appearing or toxic-appearing.   HENT:      Head: Normocephalic and atraumatic.      Right Ear: Hearing normal.      Left Ear: Hearing normal.   Eyes:      General: No visual field deficit.  Cardiovascular:      Rate and Rhythm: Normal rate and regular rhythm.      Heart sounds: Normal heart sounds.   Pulmonary:      Effort: Pulmonary effort is normal.      Breath sounds: Normal breath sounds.   Musculoskeletal:      Comments: Normal movement in all 4 extremities   Skin:     General: Skin is warm and dry.   Neurological:      General: No focal deficit present.      Mental Status: She is alert and oriented to person, place, and time.      GCS: GCS eye subscore is 4. GCS verbal subscore is 5. GCS motor subscore is 6.      Cranial Nerves: No cranial nerve deficit, dysarthria or facial asymmetry.      Sensory: Sensation is intact.      Motor: Motor function is intact.      Coordination: Coordination is intact. Coordination normal.   Psychiatric:         Mood and Affect: Mood normal.       Glucose 92 fasting this morning.  EKG shows sinus bradycardia with a rate of 55.  No acute ST wave abnormalities or other concerns " appreciated at this time.  No previous EKG to compare with.    Assessment/Plan:   Assessment    1. Tingling of left upper extremity  - EKG - Clinic Performed  - POCT Glucose    2. Bradycardia  - REFERRAL TO CARDIOLOGY    Other orders  - Multiple Vitamin (MULTI-VITAMINS PO); Take  by mouth.      No acute neurodeficit and no weakness noted on my exam today.  No indication for higher level of evaluation but do recommend she follow-up with her primary care when she returns to [facility].  Symptoms appear to be consistent with some radiculopathy.  Possible atypical headache.  Would suggest using over-the-counter ibuprofen or Tylenol per 's instructions as this has helped in the past.  No history of chest pain or bradycardia in the past.  Patient states she will follow-up with her primary care.  Possible need of neurology referral if symptoms continue.  No signs of stroke or acute neurodeficit on my exam today.  Patient is educated on red flag signs to monitor for.    Patient states she has seen cardiologist in the past for bradycardia but I did place a referral just in case again.    Differential diagnosis, natural history, supportive care, and indications for immediate follow-up discussed.   Patient given instructions and understanding of medications and treatment.    If not improving in 3-5 days, F/U with PCP or return to  if symptoms worsen.  Strict ER precautions with any new or worsening symptoms or red flag signs as discussed.  Patient agreeable to plan.    Please note that this dictation was created using voice recognition software. I have made every reasonable attempt to correct obvious errors, but I expect that there are errors of grammar and possibly content that I did not discover before finalizing the note.    Garcia Mojica PA-C

## 2024-01-18 ENCOUNTER — TELEPHONE (OUTPATIENT)
Dept: HEALTH INFORMATION MANAGEMENT | Facility: OTHER | Age: 29
End: 2024-01-18
Payer: MEDICAID